# Patient Record
Sex: FEMALE | Race: WHITE | NOT HISPANIC OR LATINO | Employment: OTHER | URBAN - METROPOLITAN AREA
[De-identification: names, ages, dates, MRNs, and addresses within clinical notes are randomized per-mention and may not be internally consistent; named-entity substitution may affect disease eponyms.]

---

## 2018-07-06 RX ORDER — ASCORBIC ACID 500 MG
500 TABLET ORAL 2 TIMES DAILY
COMMUNITY

## 2018-07-06 RX ORDER — CYANOCOBALAMIN (VITAMIN B-12) 2000 MCG
2000 TABLET ORAL 3 TIMES WEEKLY
COMMUNITY

## 2018-07-12 ENCOUNTER — OFFICE VISIT (OUTPATIENT)
Dept: NEUROSURGERY | Facility: CLINIC | Age: 63
End: 2018-07-12

## 2018-07-12 VITALS
DIASTOLIC BLOOD PRESSURE: 70 MMHG | HEIGHT: 59 IN | BODY MASS INDEX: 28.02 KG/M2 | HEART RATE: 72 BPM | SYSTOLIC BLOOD PRESSURE: 130 MMHG | WEIGHT: 139 LBS

## 2018-07-12 DIAGNOSIS — M41.20 SCOLIOSIS (AND KYPHOSCOLIOSIS), IDIOPATHIC: Primary | ICD-10-CM

## 2018-07-12 PROCEDURE — 99203 OFFICE O/P NEW LOW 30 MIN: CPT | Performed by: NEUROLOGICAL SURGERY

## 2018-07-12 RX ORDER — GABAPENTIN 300 MG/1
CAPSULE ORAL
Qty: 120 CAPSULE | Refills: 3 | Status: SHIPPED | OUTPATIENT
Start: 2018-07-12 | End: 2018-08-27 | Stop reason: SDUPTHER

## 2018-07-12 NOTE — PROGRESS NOTES
Subjective   Patient ID: Emma Parker is a 62 y.o. female who is being seen for consultation today at the request of Odell Hughes, * for lumbar disc degeneration disease. She had a lumbar MRI at [facility] on 6/15/18. She presents accompanied by her .     History of Present Illness 63 yo female with history of degenerative scoliosis.  She reports some back pain but biggest issue is her leg complaints.  She reports pain started in the back but has progressed into the anterior legs in the form of tingling and shocking sensations.  No B/b issues.  No genital numbness.  Her pain is rated 0-8/10 in a week and varies throughout the day.  Mornings are tough.  Her symptoms began only months ago.  She sees Shameka Collins in [place] for her arthritis.  PT has helped her.  No gabapentin trial.  No lyrica.  No injections.  She reports no history of osteoporosis or penia.  She takes celebrex.  She reports falls x 3.  Her legs feel weak from time to time.      The following portions of the patient's history were reviewed and updated as appropriate: allergies, current medications, past family history, past medical history, past social history, past surgical history and problem list.    Review of Systems   Constitutional: Negative for chills and fever.   Respiratory: Negative for cough and shortness of breath.    Cardiovascular: Negative for chest pain, palpitations and leg swelling.   Gastrointestinal: Negative for abdominal pain and constipation.   Genitourinary: Negative for difficulty urinating and enuresis.   Musculoskeletal: Positive for arthralgias (BLE), back pain and gait problem. Negative for neck pain.   Skin: Negative for rash.   Neurological: Positive for weakness (BLE) and numbness (or tingling BLE).   Hematological: Does not bruise/bleed easily.   Psychiatric/Behavioral: Negative for sleep disturbance.       Objective   Physical Exam   Constitutional: She is oriented to person,  place, and time.   Neurological: She is oriented to person, place, and time. Gait normal.   Reflex Scores:       Tricep reflexes are 1+ on the right side and 1+ on the left side.       Bicep reflexes are 1+ on the right side and 1+ on the left side.       Brachioradialis reflexes are 2+ on the right side and 2+ on the left side.       Patellar reflexes are 2+ on the right side and 2+ on the left side.       Achilles reflexes are 1+ on the right side and 1+ on the left side.    Neurologic Exam     Mental Status   Oriented to person, place, and time.     Motor Exam   Muscle bulk: normal  Overall muscle tone: normal    Strength   Right deltoid: 5/5  Left deltoid: 5/5  Right biceps: 5/5  Left biceps: 5/5  Right triceps: 5/5  Left triceps: 5/5  Right wrist extension: 5/5  Left wrist extension: 5/5  Right interossei: 5/5  Left interossei: 5/5  Right iliopsoas: 5/5  Left iliopsoas: 5/5  Right quadriceps: 5/5  Left quadriceps: 5/5  Right hamstrin/5  Left hamstrin/5  Right anterior tibial: 5/5  Left anterior tibial: 5/5  Right gastroc: 5/5  Left gastroc: 5/55/5 [facility]      Sensory Exam   Right arm light touch: normal  Left arm light touch: normal  Right leg light touch: normal  Left leg light touch: normal  Right arm pinprick: normal  Left arm pinprick: normal  Right leg pinprick: normal  Left leg pinprick: normal    Gait, Coordination, and Reflexes     Gait  Gait: normal    Reflexes   Right brachioradialis: 2+  Left brachioradialis: 2+  Right biceps: 1+  Left biceps: 1+  Right triceps: 1+  Left triceps: 1+  Right patellar: 2+  Left patellar: 2+  Right achilles: 1+  Left achilles: 1+         Assessment/Plan   Independent Review of Radiographic Studies:  She has significant coronal deformity and degenerative scoliosis. She has various degrees of foraminal/central stenosis. She also     Medical Decision Making:  She has significant deformity of the thoracic and lumbar spine.  She has various slips in the coronal and  saggital planes. I suggest a trial of gabapentin.  We will start this at a low dose and titrate this up.  MICHAEL's might have a role.  Her deformity would necessitate an extensive decompression and possibly staged procedure. Alternatively a spinal cord stim might be an easier option.  I will check on her in 6 weeks.      Emma was seen today for back pain.    Diagnoses and all orders for this visit:    Scoliosis (and kyphoscoliosis), idiopathic    Other orders  -     gabapentin (NEURONTIN) 300 MG capsule; Take 1 qhs for 3-5 days, then bid for 3-5 days, then tid and stay on this dose      No Follow-up on file.

## 2018-08-27 ENCOUNTER — OFFICE VISIT (OUTPATIENT)
Dept: NEUROSURGERY | Facility: CLINIC | Age: 63
End: 2018-08-27

## 2018-08-27 ENCOUNTER — HOSPITAL ENCOUNTER (OUTPATIENT)
Dept: GENERAL RADIOLOGY | Facility: HOSPITAL | Age: 63
Discharge: HOME | End: 2018-08-27
Attending: NEUROLOGICAL SURGERY | Admitting: NEUROLOGICAL SURGERY

## 2018-08-27 VITALS
BODY MASS INDEX: 29.03 KG/M2 | WEIGHT: 144 LBS | SYSTOLIC BLOOD PRESSURE: 124 MMHG | HEIGHT: 59 IN | DIASTOLIC BLOOD PRESSURE: 70 MMHG

## 2018-08-27 DIAGNOSIS — M41.20 SCOLIOSIS (AND KYPHOSCOLIOSIS), IDIOPATHIC: Primary | ICD-10-CM

## 2018-08-27 DIAGNOSIS — M41.20 SCOLIOSIS (AND KYPHOSCOLIOSIS), IDIOPATHIC: ICD-10-CM

## 2018-08-27 PROCEDURE — 72120 X-RAY BEND ONLY L-S SPINE: CPT

## 2018-08-27 PROCEDURE — 99213 OFFICE O/P EST LOW 20 MIN: CPT | Performed by: NEUROLOGICAL SURGERY

## 2018-08-27 RX ORDER — GABAPENTIN 300 MG/1
CAPSULE ORAL
Qty: 180 CAPSULE | Refills: 3 | Status: SHIPPED | OUTPATIENT
Start: 2018-08-27 | End: 2019-03-11 | Stop reason: SDUPTHER

## 2018-08-27 RX ORDER — CELECOXIB 200 MG/1
CAPSULE ORAL
Refills: 1 | COMMUNITY
Start: 2018-07-21

## 2018-08-27 NOTE — PROGRESS NOTES
Subjective   Patient ID: Emma Parker is a 62 y.o. female who is here today for follow-up for scoliosis. She presents unaccompanied.    History of Present Illness  Patient is taking the gabapentin without side effects.  She feels her hips are better.  She continues with some tingling in the legs.  Se does have leg pain.  No MICHAEL's as yet  No loss of b/b.  Strength is OK.  SHe chronically takes celebrex.  She is a nonsmoker.  SHe has had a DEXA.      The following portions of the patient's history were reviewed and updated as appropriate: allergies, current medications, past family history, past medical history, past social history, past surgical history and problem list.    Review of Systems   Musculoskeletal: Positive for arthralgias (BLE) and gait problem. Negative for back pain.   Neurological: Positive for weakness (BLE) and numbness (BLE).       Objective   Physical Exam  Neurologic Exam     Strength   Right deltoid: 5/5  Left deltoid: 5/5  Right biceps: 5/5  Left biceps: 5/5  Right triceps: 5/5  Left triceps: 5/5  Right wrist extension: 5/5  Left wrist extension: 5/5  Right interossei: 5/5  Left interossei: 5/5  Right iliopsoas: 5/5  Left iliopsoas: 5/5  Right quadriceps: 5/5  Left quadriceps: 5/5  Right hamstrin/5  Left hamstrin/5  Right anterior tibial: 5/5  Left anterior tibial: 5/5  Right gastroc: 5/5  Left gastroc: 5/55/5 [facility]       Sensory Exam   Right arm light touch: normal  Left arm light touch: normal  Right leg light touch: normal  Left leg light touch: normal  Right arm pinprick: normal  Left arm pinprick: normal  Right leg pinprick: normal  Left leg pinprick: normal     Gait, Coordination, and Reflexes      Gait  Gait: normal     Reflexes   Right brachioradialis: 2+  Left brachioradialis: 2+  Right biceps: 1+  Left biceps: 1+  Right triceps: 1+  Left triceps: 1+  Right patellar: 2+  Left patellar: 2+  Right achilles: 1+  Left achilles: 1+         Assessment/Plan   Independent Review  of Radiographic Studies:  Coronal and Saggital slips are noted with various degrees of foraminal and central stenosis.  Small upper lumbar pedicles are noted.      Medical Decision Making:  Challenging anatomy in terms of deformity correction.  We will obtain some dynamic xrays.  SHe chronically takes nsaids and has had gabapentin added.  She overall feels 50% better.  We will check dynamic xrays and refer for some MICHAEL's.  We will reachout to Shameka Collins to check her bone density evaluation results.  I will check on her after a few MICHAEL's.  She has been taking the gabapentin only at night and I counseled her to take this 3x PER day not per evening.      Emma was seen today for back pain.    Diagnoses and all orders for this visit:    Scoliosis (and kyphoscoliosis), idiopathic  -     XR spine lumbar flex and ext; Future  -     Ambulatory Referral to Hospital Pain Management Department    Other orders  -     gabapentin (NEURONTIN) 300 MG capsule; Take 1 qam, 1 q afternoon and 3 qhs      No Follow-up on file.

## 2018-09-12 ENCOUNTER — OFFICE VISIT (OUTPATIENT)
Dept: PAIN MEDICINE | Facility: CLINIC | Age: 63
End: 2018-09-12

## 2018-09-12 ENCOUNTER — RESULTS ENCOUNTER (OUTPATIENT)
Dept: PAIN MEDICINE | Facility: CLINIC | Age: 63
End: 2018-09-12

## 2018-09-12 VITALS
DIASTOLIC BLOOD PRESSURE: 81 MMHG | SYSTOLIC BLOOD PRESSURE: 123 MMHG | HEART RATE: 83 BPM | WEIGHT: 146 LBS | RESPIRATION RATE: 16 BRPM | BODY MASS INDEX: 29.43 KG/M2 | OXYGEN SATURATION: 97 % | TEMPERATURE: 97.9 F | HEIGHT: 59 IN

## 2018-09-12 DIAGNOSIS — M41.20 SCOLIOSIS (AND KYPHOSCOLIOSIS), IDIOPATHIC: ICD-10-CM

## 2018-09-12 DIAGNOSIS — M51.36 BULGE OF LUMBAR DISC WITHOUT MYELOPATHY: ICD-10-CM

## 2018-09-12 DIAGNOSIS — M54.16 LUMBAR RADICULOPATHY: ICD-10-CM

## 2018-09-12 DIAGNOSIS — M41.20 SCOLIOSIS (AND KYPHOSCOLIOSIS), IDIOPATHIC: Primary | ICD-10-CM

## 2018-09-12 LAB
POC AMPHETAMINES: NEGATIVE
POC BARBITURATES: NEGATIVE
POC BENZODIAZEPHINES: NEGATIVE
POC COCAINE: NEGATIVE
POC METHADONE: NEGATIVE
POC METHAMPHETAMINE SCREEN URINE: NEGATIVE
POC OPIATES: NEGATIVE
POC OXYCODONE: NEGATIVE
POC PHENCYCLIDINE: NEGATIVE
POC PROPOXYPHENE: NEGATIVE
POC THC: NEGATIVE
POC TRICYCLIC ANTIDEPRESSANTS: NEGATIVE

## 2018-09-12 PROCEDURE — 99204 OFFICE O/P NEW MOD 45 MIN: CPT | Performed by: NURSE PRACTITIONER

## 2018-09-12 PROCEDURE — 80305 DRUG TEST PRSMV DIR OPT OBS: CPT | Performed by: NURSE PRACTITIONER

## 2018-09-12 NOTE — PROGRESS NOTES
CHIEF COMPLAINT  Pt is here for LBP/hip pain since 2/2018 and daily pain,stinging,tingling in bilateral legs anterior and posterior,to mid calf, Pt has been DXd with scoliosis  No injections,no previous pain clinics.  Hx of P.T. until 2 months ago ,D/Cd when DXd with scoliosis.  Currently on neurontin ,which repotedly provides some relief of bilat. Hip pain.  Standing,squatting,bending,sitting increases LBP.  No hx of opiates.    Subjective   Emma Burns is a  RHD 62 y.o. female.   She presents to the office for evaluation of back and leg pain. She was referred here by Dr Collins.  She lives with her . She is retired and was a .  Does not smoke. Does drink alcohol, 2-3/week. Denies any illicit substance use. Family history is negative for alcoholism and addiction. Family history is positive for back problems(mother) and osteoporosis(mother).    Complains of pain in the middle of her low back. It can radiate into her hips, buttocks and legs. It can radiate to the posterior thighs and goes past the knees. Also has pain and tingling on the front of her legs and stops at ankles. The back pain is intermittent aching. The leg pain is fairly continuous throbbing, burning and numbing. Pain increases with bending, squatting and prolonged sitting; pain decreases with reclining position, elevate legs, change position, laying down and medication.  Continues with Celebrex 200 mg daily and Gabapentin 300 mg 5/day(1 in morning, 1 in afternoon and 3 at night). ADL's by self. Denies any bowel or bladder incontinence. Complains of occasional feelings of leg weakness. No recent falls. Had falls prior to starting gabapentin.    Her back, hip and leg pain started approximately February of this year. Does not remember any specific injury or cause of increased pain. Went to PCP. Sent for MRI and PT.  Was sent to Dr. Collins. Was started on Gabapentin. Improved with Neurontin. Sent to pain  "management for epidurals.     Has an \"arthritis doctor\" Dr. Shameka Collins. Sent patient to PT. Gets injections in hands every 6 months. Referred to patient to Dr. Collins.    Failed Voltaren and Robaxin.  No history of previous epidurals or back injections.  Failed PT with no improvement.    Back Pain   This is a chronic problem. The current episode started more than 1 month ago. The problem occurs constantly. The problem has been gradually worsening since onset. The pain is present in the lumbar spine, sacro-iliac and gluteal. The quality of the pain is described as aching. Radiates to: BLE's. The pain is at a severity of 5/10 (pain ranges from 4-8/10VAS). The pain is moderate. The pain is worse during the day (pain is always worse in morning). The symptoms are aggravated by bending, position, standing and twisting. Associated symptoms include numbness (legs occ N/T). Pertinent negatives include no bladder incontinence, bowel incontinence, chest pain or dysuria. Risk factors include sedentary lifestyle. Treatments tried: gabapentin.      PEG Assessment   What number best describes your pain on average in the past week?7  What number best describes how, during the past week, pain has interfered with your enjoyment of life?7  What number best describes how, during the past week, pain has interfered with your general activity?  7      Current Outpatient Prescriptions:   •  Calcium Carbonate-Vitamin D (CALTRATE 600+D PO), Take  by mouth 2 (Two) Times a Day., Disp: , Rfl:   •  celecoxib (CeleBREX) 200 MG capsule, TK 1 C PO QD, Disp: , Rfl: 1  •  Cholecalciferol (VITAMIN D PO), Take  by mouth., Disp: , Rfl:   •  cyanocobalamin (VITAMIN B-12) 2000 MCG tablet tablet, Take 2,000 mcg by mouth 3 (Three) Times a Week., Disp: , Rfl:   •  gabapentin (NEURONTIN) 300 MG capsule, Take 1 qam, 1 q afternoon and 3 qhs, Disp: 180 capsule, Rfl: 3  •  montelukast (SINGULAIR) 10 MG tablet, Take 10 mg by mouth Every Night., Disp: , Rfl:   •  " Multiple Vitamins-Minerals (CENTRUM SILVER 50+WOMEN PO), Take  by mouth 2 (Two) Times a Day., Disp: , Rfl:   •  venlafaxine (EFFEXOR) 75 MG tablet, Take 75 mg by mouth 2 (Two) Times a Day., Disp: , Rfl:   •  vitamin C (ASCORBIC ACID) 500 MG tablet, Take 500 mg by mouth 2 (Two) Times a Day., Disp: , Rfl:     The following portions of the patient's history were reviewed and updated as appropriate: allergies, current medications, past family history, past medical history, past social history, past surgical history and problem list.      REVIEW OF PERTINENT MEDICAL DATA    Reviewed Dr. Collins office visit 8/27/18. Patient presents for follow-up of scoliosis. Was previously started on gabapentin 300 milligrams 3 times a day. Was only taking a night. Does notice some improvement since starting with this. No history of previous epidurals. Chronically Celebrex.  Reviewed x-rays. The patient is a foraminal central stenosis. Changing anatomy at times of deformity correction. Had some dynamic x-rays. Events of the gabapentin. Refer for MICHAEL's. Only taking Gabapentin at night. Counseled to take 1 in am, 1 in afternoon and 3 in HS.    OSMIN [id number]--  Prescription for Gabapentin 300 mg #180 from Dr. Collins on 8-27-18. Previous prescriptions for gabapentin from Dr. Collins. Otherwise negative. One pharmacy and one provider on [address].    GREER Inventory-- 16        Review of Systems   Constitutional: Positive for activity change (decreased). Negative for appetite change.   HENT: Negative for hearing loss and trouble swallowing.    Eyes: Negative for visual disturbance.   Respiratory: Negative for apnea, chest tightness and shortness of breath.    Cardiovascular: Negative for chest pain.   Gastrointestinal: Negative for bowel incontinence, constipation, diarrhea and nausea.   Genitourinary: Negative for bladder incontinence, difficulty urinating and dysuria.   Musculoskeletal: Positive for back pain.   Neurological: Positive for  "light-headedness (with neurontin occ.) and numbness (legs occ N/T). Negative for dizziness.   Psychiatric/Behavioral: Positive for sleep disturbance. Negative for suicidal ideas.         Vitals:    09/12/18 1249   BP: 123/81   Pulse: 83   Resp: 16   Temp: 97.9 °F (36.6 °C)   SpO2: 97%   Weight: 66.2 kg (146 lb)   Height: 149.9 cm (59.02\")   PainSc: 5  Comment: LBP bilaterally ranges from 4-8/10   PainLoc: Back     Objective   Physical Exam   Constitutional: She is oriented to person, place, and time. Vital signs are normal. She appears well-developed and well-nourished. She is cooperative.   HENT:   Head: Normocephalic and atraumatic.   Nose: Nose normal.   Eyes: Pupils are equal, round, and reactive to light. Conjunctivae, EOM and lids are normal.   Neck: Trachea normal, normal range of motion and full passive range of motion without pain. Neck supple.   Cardiovascular: Normal rate, regular rhythm and normal heart sounds.    Pulmonary/Chest: Effort normal and breath sounds normal. No respiratory distress.   Abdominal: Normal appearance.   Musculoskeletal:        Lumbar back: She exhibits decreased range of motion, tenderness and pain. She exhibits no bony tenderness and no spasm.   Negative si joint tenderness  Minimal lumbar facet tenderness    + SLR BILATERALLY   Neurological: She is alert and oriented to person, place, and time. She has normal strength. No cranial nerve deficit. Gait normal.   Reflex Scores:       Patellar reflexes are 1+ on the right side and 1+ on the left side.       Achilles reflexes are 2+ on the right side and 2+ on the left side.  Skin: Skin is warm, dry and intact.   Psychiatric: She has a normal mood and affect. Her speech is normal and behavior is normal. Judgment and thought content normal. Cognition and memory are normal.   Nursing note and vitals reviewed.      Assessment/Plan   Emma was seen today for back pain.    Diagnoses and all orders for this visit:    Scoliosis (and " kyphoscoliosis), idiopathic    Lumbar radiculopathy  -     Case Request    Bulge of lumbar disc without myelopathy  -     Case Request      --- Routine new patient initial UDS in office.  The specimen was viewed and the immunoassay result reviewed and is NEGATIVE(APPROPRIATE).  This specimen will be sent to [facility] for confirmation.     --- LESI L4-5 or L5-S1; x2, 2-4 weeks apart. With Dr. Sy per Dr. Collins order. No blood thinners. Reviewed the procedure at length with the patient.  Included in the review was expectations, complications, risk and benefits.The procedure was described in detail and the risks, benefits and alternatives were discussed with the patient (including but not limited to: bleeding, infection, nerve damage, worsening of pain, inability to perform injection, paralysis, seizures, and death) who agreed to proceed.  Discussed the potential for sedation if warranted/wanted.  Questions were answered and in a way the patient could understand.  Patient verbalized understanding and wishes to proceed.  This intervention will be ordered.  --- Continue with regimen as prescribed by Dr. Collins.  --- Follow-up after procedures or sooner if needed.       OSMIN REPORT    As part of the patient's treatment plan, I am prescribing controlled substances. The patient has been made aware of appropriate use of such medications, including potential risk of somnolence, limited ability to drive and/or work safely, and the potential for dependence or overdose. It has also bee made clear that these medications are for use by this patient only, without concomitant use of alcohol or other substances unless prescribed.     Patient has completed prescribing agreement detailing terms of continued prescribing of controlled substances, including monitoring OSMIN reports, urine drug screening, and pill counts if necessary. The patient is aware that inappropriate use will results in cessation of prescribing such  medications.    OSMIN report has been reviewed and scanned into the patient's chart.    As the clinician, I personally reviewed the OSMIN from 9-11-18 while the patient was in the office today.    History and physical exam exhibit continued safe and appropriate use of controlled substances.         EMR Dragon/Transcription disclaimer:   Much of this encounter note is an electronic transcription/translation of spoken language to printed text. The electronic translation of spoken language may permit erroneous, or at times, nonsensical words or phrases to be inadvertently transcribed; Although I have reviewed the note for such errors, some may still exist.

## 2018-09-27 ENCOUNTER — OUTSIDE FACILITY SERVICE (OUTPATIENT)
Dept: PAIN MEDICINE | Facility: CLINIC | Age: 63
End: 2018-09-27

## 2018-09-27 ENCOUNTER — DOCUMENTATION (OUTPATIENT)
Dept: PAIN MEDICINE | Facility: CLINIC | Age: 63
End: 2018-09-27

## 2018-09-27 PROCEDURE — 62323 NJX INTERLAMINAR LMBR/SAC: CPT | Performed by: ANESTHESIOLOGY

## 2018-09-27 NOTE — PROGRESS NOTES
Lumbar Epidural Steroid Injection  [facility]    PREOPERATIVE DIAGNOSIS:   Lumbar Disc Displacement, Lumbar Radiculopathy and scoliosis  POSTOPERATIVE DIAGNOSIS:  Same as preop diagnosis    PROCEDURE:   Lumbar Epidural Steroid Injection, Therapeutic Translaminar Injection, with epidurogram, at  L4/L5 level    PRE-PROCEDURE DISCUSSION WITH PATIENT:    Risks and complications were discussed with the patient prior to starting the procedure and informed consent was obtained.  We discussed various topics including but not limited to bleeding, infection, injury, paralysis, nerve injury, dural puncture, coma, death, worsening of clinical picture, lack of pain relief, and postprocedural soreness.    SURGEON:  Alli Sy MD    REASON FOR PROCEDURE:    Diagnostic injection at this level is needed, Degenerative changes are noted in the area., Radiating pattern of pain is likely consistent with degenerative changes in the area. and Acute pain flareup is noted & problematic, and the injection is used to attempt to break the flareup.      SEDATION:  Versed 2mg & Fentanyl 50 mcg IV  ANESTHETIC:  Marcaine 0.25%  STEROID:   Methylprednisolone (DEPO MEDROL) 80mg/ml    DESCRIPTON OF PROCEDURE:    After obtaining informed consent, I.V. was started in the preop area.   The patient was taken to the operating room and placed in the prone position.  EKG, blood pressure, and pulse oximeter were monitored throughout, and sedation was provided as needed by the RN under my guidance. All pressure points were well padded.  The lumbar spine area was prepped with Chloraprep and draped in a sterile fashion.  Under fluoroscopic guidance, the above mentioned interlaminar space was identified. Skin and subcutaneous tissues were anesthetized with 1% lidocaine in the middle of the space. A Tuohy needle was introduced through the skin and advanced to this interlaminar space and into the epidural space under fluoroscopic guidance  and verified with loss-of-resistance technique to air.  After confirming the position of the needle with the fluoroscope with all the views, and after aspiration was confirmed negative for blood and CSF, 1.5 mL of Omnipaque was injected.  After seeing appropriate epidurogram with lateral and PA views, a total of 3 cc solution was injected, consisting of 1cc of local anesthetic as above, with normal saline and injectable steroid as above.     ESTIMATED BLOOD LOSS:  <5 mL  SPECIMENS:  None    COMPLICATIONS:     No complications were noted., There was no indication of vascular uptake on live injection of contrast dye. and The patient did not have any signs of postprocedure numbness nor weakness.    TOLERANCE & DISCHARGE CONDITION:    The patient tolerated the procedure well.  The patient was transported to the recovery area without difficulties.  The patient was discharged to home under the care of family in stable and satisfactory condition.    PLAN OF CARE:  1. The patient was given our standard instruction sheet.  2. The patient will Repeat injection 2-4 wks  3. The patient will resume all medications as per the medication reconciliation sheet.

## 2018-10-01 ENCOUNTER — TELEPHONE (OUTPATIENT)
Dept: PAIN MEDICINE | Facility: CLINIC | Age: 63
End: 2018-10-01

## 2018-10-01 NOTE — TELEPHONE ENCOUNTER
Ms. MillerMinton called today and states that she had a lumbar epidural done by Dr. Sy on 9/27/18. She states that today she is having numbness and tingling in her face. I informed her that her symptoms wouldn't be caused by the lumbar epidural. I asked her if she had any other symptoms and she states that she has had some grogginess today. I told her to call her PCP immediately and let them know her symptoms as they may want to see her. I told her if her symptoms get worse she should go to the urgent care or ER and get checked out.

## 2018-10-24 ENCOUNTER — OFFICE VISIT (OUTPATIENT)
Dept: NEUROSURGERY | Facility: CLINIC | Age: 63
End: 2018-10-24

## 2018-10-24 VITALS
HEIGHT: 59 IN | DIASTOLIC BLOOD PRESSURE: 90 MMHG | WEIGHT: 143 LBS | SYSTOLIC BLOOD PRESSURE: 138 MMHG | BODY MASS INDEX: 28.83 KG/M2

## 2018-10-24 DIAGNOSIS — M41.20 SCOLIOSIS (AND KYPHOSCOLIOSIS), IDIOPATHIC: ICD-10-CM

## 2018-10-24 DIAGNOSIS — M51.36 BULGE OF LUMBAR DISC WITHOUT MYELOPATHY: Primary | ICD-10-CM

## 2018-10-24 PROCEDURE — 99213 OFFICE O/P EST LOW 20 MIN: CPT | Performed by: NEUROLOGICAL SURGERY

## 2018-10-24 NOTE — PROGRESS NOTES
Subjective   Patient ID: Emma Burns is a 62 y.o. female who is here today for follow-up for scoliosis. She had an LESI at [facility] on 18. She presents unaccompanied.    History of Present Illness  Patient presents after having and MICHAEL.  She reports some side effects of gabapentin - being in a fog, weight gain, sleepiness. She feels 75-80% better than her 1st visit.  She reports some intermittent facial numbness (happend 3-4 times lasting up to 20 minutes).  She is not on a baby ASA.  Strength is OK.  No loss of b/b. No new numbness.  Sensation improved in her legs.        The following portions of the patient's history were reviewed and updated as appropriate: allergies, current medications, past family history, past medical history, past social history, past surgical history and problem list.    Review of Systems   Musculoskeletal: Negative for arthralgias and back pain.   Neurological: Negative for weakness and numbness.       Objective   Physical Exam  Neurologic Exam     Sensory Exam   Right leg light touch: normal  Left leg light touch: normal  Right leg pinprick: normal  Left leg pinprick: normal      Right iliopsoas: 5/5  Left iliopsoas: 5/5  Right quadriceps: 5/5  Left quadriceps: 5/5  Right hamstrin/5  Left hamstrin/5  Right anterior tibial: 5/5  Left anterior tibial: 5/5  Right gastroc: 5/5  Left gastroc: 5/55/5 EHL      Right patellar: 2+  Left patellar: 2+  Right achilles: 1+  Left achilles: 1+    Assessment/Plan   Independent Review of Radiographic Studies:  No ligamentous laxity of her dynamic xrays.      Medical Decision Making:  Her facial numbness is not related to her MICHAEL in my opinion.  This is not likley a side effect of gabapentin either.  Raises the ? Of a TIA.  We will wean her gabapentin to 300 in am and 600 qhs and see if this remits.  I also suggest adding a baby ASA and discussing with Dr. Hughes.   She has made some good progress.  We will continue to  monitor her progress in the hopes we can avoid a major reconstructive effort.  We discussed follow up in a few months in the hopes we can wean the gabapentin off.      Emma was seen today for back pain.    Diagnoses and all orders for this visit:    Bulge of lumbar disc without myelopathy    Scoliosis (and kyphoscoliosis), idiopathic      No Follow-up on file.

## 2018-12-18 ENCOUNTER — TELEPHONE (OUTPATIENT)
Dept: NEUROSURGERY | Facility: CLINIC | Age: 63
End: 2018-12-18

## 2018-12-18 NOTE — TELEPHONE ENCOUNTER
Pt is going to [place] for 2 months and will not be back until March. She called backed and rescheduled to 3/15/19 at [facility].

## 2019-03-11 ENCOUNTER — TELEPHONE (OUTPATIENT)
Dept: NEUROSURGERY | Facility: CLINIC | Age: 64
End: 2019-03-11

## 2019-03-11 RX ORDER — GABAPENTIN 300 MG/1
CAPSULE ORAL
Qty: 180 CAPSULE | Refills: 1 | Status: SHIPPED | OUTPATIENT
Start: 2019-03-11 | End: 2019-07-10 | Stop reason: SDUPTHER

## 2019-03-11 RX ORDER — GABAPENTIN 300 MG/1
CAPSULE ORAL
Qty: 180 CAPSULE | Refills: 0 | OUTPATIENT
Start: 2019-03-11

## 2019-03-11 NOTE — TELEPHONE ENCOUNTER
Last OSMIN/Controlled substance agreement 7/12/18.  Patient has appt with [facility] 3/28.  Will send to [facility] (pharmacy sent over electronic rx request)

## 2019-03-11 NOTE — TELEPHONE ENCOUNTER
Patient last seen on  10/24/18 by Dr Collins for bulge of lumbar disc without myelopathy.    Patient called her pharmacy for a refill on her Gabapentin 300 mg  1pill in the am and 3 pills at night.   The pharmacy told her to call us for a refill.  Pt is completely out of her medicine   [facility] [phone]

## 2019-05-01 ENCOUNTER — OFFICE VISIT (OUTPATIENT)
Dept: NEUROSURGERY | Facility: CLINIC | Age: 64
End: 2019-05-01

## 2019-05-01 VITALS
HEIGHT: 59 IN | WEIGHT: 144 LBS | SYSTOLIC BLOOD PRESSURE: 137 MMHG | BODY MASS INDEX: 29.03 KG/M2 | RESPIRATION RATE: 12 BRPM | DIASTOLIC BLOOD PRESSURE: 90 MMHG | HEART RATE: 72 BPM

## 2019-05-01 DIAGNOSIS — M51.36 BULGE OF LUMBAR DISC WITHOUT MYELOPATHY: ICD-10-CM

## 2019-05-01 DIAGNOSIS — M41.20 SCOLIOSIS (AND KYPHOSCOLIOSIS), IDIOPATHIC: Primary | ICD-10-CM

## 2019-05-01 PROCEDURE — 99213 OFFICE O/P EST LOW 20 MIN: CPT | Performed by: NEUROLOGICAL SURGERY

## 2019-05-01 NOTE — PROGRESS NOTES
Subjective   Patient ID: Emma Burns is a 63 y.o. female is here today for follow-up for scoliosis. Pt is unaccompanied.    History of Present Illness  She has been diagnosed with carpal tunnel and is wearing braces.  Her leg tingling is resolved.  Her back bothers her some.  SHe is taking 300mg in the AM and 900mg at night.  She feels foggy.      The following portions of the patient's history were reviewed and updated as appropriate: allergies, current medications, past family history, past medical history, past social history, past surgical history and problem list.    Review of Systems   Genitourinary: Negative for difficulty urinating and enuresis.   Musculoskeletal: Positive for gait problem.        Bilateral hip pain   Neurological: Positive for weakness (bilateral legs). Negative for numbness.   Psychiatric/Behavioral: Positive for sleep disturbance.       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  Small upper lumbar pedicles are noted.      Medical Decision Making:  She occasionally takes NSAIDS.  We will try weaning her gabapentin some in hopes of mitigating her fogginess.  Goal dose will be 300mg bid and see how she does.  If her CTS is troubling I would be glad to assist.      Emma was seen today for scoliosis.    Diagnoses and all orders for this visit:    Scoliosis (and kyphoscoliosis),  idiopathic    Bulge of lumbar disc without myelopathy      No Follow-up on file.

## 2019-07-10 NOTE — TELEPHONE ENCOUNTER
At last OV, JESÚS said to wean to 300 mg BID. How is she taking med now? Also, she needs an updated Bassam- requires every 90 days.

## 2019-07-11 RX ORDER — GABAPENTIN 300 MG/1
CAPSULE ORAL
Qty: 120 CAPSULE | Refills: 2 | Status: SHIPPED | OUTPATIENT
Start: 2019-07-11

## 2021-03-22 ENCOUNTER — BULK ORDERING (OUTPATIENT)
Dept: CASE MANAGEMENT | Facility: OTHER | Age: 66
End: 2021-03-22

## 2021-03-22 DIAGNOSIS — Z23 IMMUNIZATION DUE: ICD-10-CM
